# Patient Record
Sex: MALE | Race: ASIAN
[De-identification: names, ages, dates, MRNs, and addresses within clinical notes are randomized per-mention and may not be internally consistent; named-entity substitution may affect disease eponyms.]

---

## 2017-02-08 NOTE — ER DOCUMENT REPORT
ED Cardiac





- General


Chief Complaint: Chest Pain


Stated Complaint: CHEST PAIN


Time seen by provider: 04:55


Mode of Arrival: Ambulatory


Information source: Patient


TRAVEL OUTSIDE OF THE [place] IN LAST 30 DAYS: No





- HPI


Patient complains to provider of: Chest pain


Was the onset of pain: Sudden


Is the pain a: New problem


Chest pain location: Substernal


Quality of pain: Achy


Severity now: Mild


Severity at worst: Moderate


Pain level currently: 2


Chest pain precipitating factors: At Rest


Positive cardiac history: No


Exacerbated by: Coughing, Deep breaths, Lying flat


Relieved by: Nothing


Similar symptoms previously: No


Recently seen / treated by doctor: Yes


Notes: 


Patient is a 46-year-old male presenting to the emergency room complaining of 

chest pain that started yesterday afternoon, he had laparoscopic surgery on his 

left shoulder, shortly after the surgery his symptoms started, he denies any 

cough, cold or congestion, no shortness of breath, he was intubated for the 

procedure and it lasted approximately 2 hours, he denies a history of similar 

symptoms previously, no risk factors for heart disease, denies any leg pain or 

calf tenderness, states the pain is worse when he is lying flat, he is also had 

hiccups throughout the day





Past Medical History





- General


Information source: Patient





- Social History


Smoking Status: Current Every Day Smoker


Family History: Reviewed & Not Pertinent


Patient has suicidal ideation: No


Patient has homicidal ideation: No


Renal/ Medical History: Denies: Hx Peritoneal Dialysis


Past Surgical History: Reports: Hx Orthopedic Surgery - Lt Shoulder x2





- Immunizations


Hx Diphtheria, Pertussis, Tetanus Vaccination: Yes





Review of Systems





- Review of Systems


Constitutional: No symptoms reported


EENT: No symptoms reported


Cardiovascular: See HPI


Respiratory: See HPI


Gastrointestinal: No symptoms reported


Genitourinary: No symptoms reported


Male Genitourinary: No symptoms reported


Musculoskeletal: No symptoms reported


Skin: No symptoms reported


Hematologic/Lymphatic: No symptoms reported


Neurological/Psychological: No symptoms reported


-: Yes All other systems reviewed and negative





Physical Exam





- Vital signs


Vitals: 


 











Temp Pulse Resp BP Pulse Ox


 


 98.1 F   76   18   133/67 H  96 


 


 02/07/17 23:55  02/07/17 23:55  02/07/17 23:55  02/07/17 23:55  02/07/17 23:55











Interpretation: Normal





- General


General appearance: Appears well, Alert





- HEENT


Head: Normocephalic, Atraumatic


Eyes: Normal


Pupils: PERRL





- Respiratory


Respiratory status: No respiratory distress


Chest status: Nontender


Breath sounds: Normal


Chest palpation: Normal





- Cardiovascular


Rhythm: Regular


Heart sounds: Normal auscultation


Murmur: No





- Abdominal


Inspection: Normal


Distension: No distension


Bowel sounds: Normal


Tenderness: Nontender


Organomegaly: No organomegaly





- Back


Back: Normal, Nontender





- Extremities


General upper extremity: Normal color, Normal temperature


General lower extremity: Normal inspection, Nontender, Normal color, Normal ROM

, Normal temperature, Normal weight bearing.  No: Rajesh's sign


Shoulder: Other - Left shoulder and orthopedic immobilizer





- Neurological


Neuro grossly intact: Yes


Cognition: Normal


Orientation: AAOx4


Janie Coma Scale Eye Opening: Spontaneous


[place] Coma Scale Verbal: Oriented


Janie Coma Scale Motor: Obeys Commands


[place] Coma Scale Total: 15


Speech: Normal


Motor strength normal: LUE, RUE, LLE, RLE


Sensory: Normal





- Psychological


Associated symptoms: Normal affect, Normal mood





- Skin


Skin Temperature: Warm


Skin Moisture: Dry


Skin Color: Normal





Course





- Re-evaluation


Re-evalutation: 


02/08/17 04:55


Lab and imaging findings discussed with patient and spouse at bedside which are 

consistent for likely infectious process in the left lower lobe, patient will 

be started on antibiotics, advised to follow-up with his primary care provider 

and surgeon or return if symptoms worsen or fail to improve over the next 2-3 

days, I did discuss the possibility of developing a PE after surgery, however 

patient's surgery only lasted 2 hours and it was performed within the last 24 

hours so it is highly unlikely that he has developed a pulmonary emboli in the 

short period of time, he has no calf tenderness as well, but patient was 

advised on signs and symptoms and advised to return if they worsen, patient 

acknowledges understanding and agreement with this plan














- Vital Signs


Vital signs: 


 











Temp Pulse Resp BP Pulse Ox


 


 98.1 F   67   16   117/72   97 


 


 02/08/17 05:15  02/08/17 05:15  02/08/17 05:15  02/08/17 05:15  02/08/17 05:15














- Laboratory


Result Diagrams: 


 02/08/17 00:18





 02/08/17 00:18


Laboratory results interpreted by me: 


 











  02/08/17 02/08/17





  00:18 00:18


 


WBC  12.6 H 


 


Seg Neutrophils %  81.9 H 


 


Lymphocytes %  11.2 L 


 


Absolute Neutrophils  10.3 H 


 


ALT   102 H


 


Creatine Kinase   773 H














- Diagnostic Test


Radiology reviewed: Image reviewed, Reports reviewed





- EKG Interpretation by Me


EKG shows normal: Sinus rhythm


Rate: Normal


Rhythm: NSR





Discharge





- Discharge


Clinical Impression: 


Chest pain


Qualifiers:


 Chest pain type: unspecified Qualified Code(s): R07.9 - Chest pain, unspecified





Pneumonia


Qualifiers:


 Pneumonia type: due to unspecified organism Laterality: left Lung location: 

lower lobe of lung Qualified Code(s): J18.1 - Lobar pneumonia, unspecified 

organism





Condition: Stable


Disposition: HOME, SELF-CARE


Instructions:  Chest Pain of Unclear Cause ([facility]), Pneumonia ([facility])


Additional Instructions: 


Follow up with your primary care provider in one to 2 days.  Return to the 

emergency room immediately if symptoms worsen or any additional concerns.


Prescriptions: 


Levofloxacin [Levaquin 750 mg Tablet] 750 mg PO DAILY #5 tablet


Referrals: 


CASPER JOSHUA DO [Primary Care Provider] - Follow up as needed

## 2017-02-08 NOTE — EKG REPORT
SEVERITY:- BORDERLINE ECG -

SINUS RHYTHM

BORDERLINE T ABNORMALITIES, ANTERIOR LEADS

:

Confirmed by: Mikey Tavarez MD 08-Feb-2017 08:05:32

## 2017-06-21 NOTE — RADIOLOGY REPORT (SQ)
EXAM DESCRIPTION:  KNEE LEFT 4 VIEWS



COMPLETED DATE/TIME:  6/21/2017 5:22 pm



REASON FOR STUDY:  PAIN IN LEFT KNEE M25.562  PAIN IN LEFT KNEE



COMPARISON:  None.



NUMBER OF VIEWS:  Four views.



TECHNIQUE:  AP, lateral, and both oblique radiographic images acquired of the left knee.



LIMITATIONS:  None.



FINDINGS:  MINERALIZATION: Normal.

BONES: No acute fracture or dislocation.  No worrisome bone lesions.

JOINT: No effusion.

SOFT TISSUES: No soft tissue swelling.  No radio-opaque foreign body.

OTHER: No other significant finding.



IMPRESSION:  NEGATIVE STUDY OF THE LEFT KNEE. NO RADIOGRAPHIC EVIDENCE OF ACUTE INJURY.



TECHNICAL DOCUMENTATION:  JOB ID:  [id number]

 2011 [employer]- All Rights Reserved

## 2018-04-02 ENCOUNTER — HOSPITAL ENCOUNTER (OUTPATIENT)
Dept: HOSPITAL 62 - OD | Age: 48
End: 2018-04-02
Attending: NURSE PRACTITIONER
Payer: COMMERCIAL

## 2018-04-02 DIAGNOSIS — M79.89: ICD-10-CM

## 2018-04-02 DIAGNOSIS — M25.532: Primary | ICD-10-CM

## 2018-04-02 LAB
ADD MANUAL DIFF: NO
ALBUMIN SERPL-MCNC: 3.9 G/DL (ref 3.5–5)
ALP SERPL-CCNC: 87 U/L (ref 38–126)
ALT SERPL-CCNC: 60 U/L (ref 21–72)
ANION GAP SERPL CALC-SCNC: 9 MMOL/L (ref 5–19)
AST SERPL-CCNC: 35 U/L (ref 17–59)
BASOPHILS # BLD AUTO: 0.1 10^3/UL (ref 0–0.2)
BASOPHILS NFR BLD AUTO: 0.9 % (ref 0–2)
BILIRUB DIRECT SERPL-MCNC: 0.1 MG/DL (ref 0–0.4)
BILIRUB SERPL-MCNC: 0.1 MG/DL (ref 0.2–1.3)
BUN SERPL-MCNC: 18 MG/DL (ref 7–20)
CALCIUM: 9.2 MG/DL (ref 8.4–10.2)
CHLORIDE SERPL-SCNC: 107 MMOL/L (ref 98–107)
CO2 SERPL-SCNC: 26 MMOL/L (ref 22–30)
CRP SERPL-MCNC: 33.9 MG/L (ref ?–10)
EOSINOPHIL # BLD AUTO: 0.3 10^3/UL (ref 0–0.6)
EOSINOPHIL NFR BLD AUTO: 4.5 % (ref 0–6)
ERYTHROCYTE [DISTWIDTH] IN BLOOD BY AUTOMATED COUNT: 13.3 % (ref 11.5–14)
ERYTHROCYTE [SEDIMENTATION RATE] IN BLOOD: 35 MM/HR (ref 0–15)
GLUCOSE SERPL-MCNC: 106 MG/DL (ref 75–110)
HCT VFR BLD CALC: 39.5 % (ref 37.9–51)
HGB BLD-MCNC: 13.4 G/DL (ref 13.5–17)
LYMPHOCYTES # BLD AUTO: 1.7 10^3/UL (ref 0.5–4.7)
LYMPHOCYTES NFR BLD AUTO: 24.8 % (ref 13–45)
MCH RBC QN AUTO: 29.5 PG (ref 27–33.4)
MCHC RBC AUTO-ENTMCNC: 34 G/DL (ref 32–36)
MCV RBC AUTO: 87 FL (ref 80–97)
MONOCYTES # BLD AUTO: 0.6 10^3/UL (ref 0.1–1.4)
MONOCYTES NFR BLD AUTO: 9.4 % (ref 3–13)
NEUTROPHILS # BLD AUTO: 4.2 10^3/UL (ref 1.7–8.2)
NEUTS SEG NFR BLD AUTO: 60.4 % (ref 42–78)
PLATELET # BLD: 276 10^3/UL (ref 150–450)
POTASSIUM SERPL-SCNC: 4.1 MMOL/L (ref 3.6–5)
PROT SERPL-MCNC: 7.2 G/DL (ref 6.3–8.2)
RBC # BLD AUTO: 4.56 10^6/UL (ref 4.35–5.55)
SODIUM SERPL-SCNC: 141.7 MMOL/L (ref 137–145)
TOTAL CELLS COUNTED % (AUTO): 100 %
URATE SERPL-MCNC: 5.4 MG/DL (ref 3.5–8.5)
WBC # BLD AUTO: 6.9 10^3/UL (ref 4–10.5)

## 2018-04-02 PROCEDURE — 85025 COMPLETE CBC W/AUTO DIFF WBC: CPT

## 2018-04-02 PROCEDURE — 85652 RBC SED RATE AUTOMATED: CPT

## 2018-04-02 PROCEDURE — 86431 RHEUMATOID FACTOR QUANT: CPT

## 2018-04-02 PROCEDURE — 36415 COLL VENOUS BLD VENIPUNCTURE: CPT

## 2018-04-02 PROCEDURE — 86430 RHEUMATOID FACTOR TEST QUAL: CPT

## 2018-04-02 PROCEDURE — 80053 COMPREHEN METABOLIC PANEL: CPT

## 2018-04-02 PROCEDURE — 86038 ANTINUCLEAR ANTIBODIES: CPT

## 2018-04-02 PROCEDURE — 84550 ASSAY OF BLOOD/URIC ACID: CPT

## 2018-04-02 PROCEDURE — 86140 C-REACTIVE PROTEIN: CPT

## 2018-07-06 ENCOUNTER — HOSPITAL ENCOUNTER (OUTPATIENT)
Dept: HOSPITAL 62 - END | Age: 48
Discharge: HOME | End: 2018-07-06
Attending: INTERNAL MEDICINE
Payer: COMMERCIAL

## 2018-07-06 VITALS — SYSTOLIC BLOOD PRESSURE: 104 MMHG | DIASTOLIC BLOOD PRESSURE: 87 MMHG

## 2018-07-06 DIAGNOSIS — Z12.11: Primary | ICD-10-CM

## 2018-07-06 DIAGNOSIS — M05.89: ICD-10-CM

## 2018-07-06 DIAGNOSIS — F17.210: ICD-10-CM

## 2018-07-06 DIAGNOSIS — Z79.1: ICD-10-CM

## 2018-07-06 DIAGNOSIS — Z79.899: ICD-10-CM

## 2018-07-06 PROCEDURE — 45378 DIAGNOSTIC COLONOSCOPY: CPT

## 2018-07-06 RX ADMIN — MIDAZOLAM HYDROCHLORIDE ONE MG: 1 INJECTION, SOLUTION INTRAMUSCULAR; INTRAVENOUS at 09:08

## 2018-07-06 RX ADMIN — MIDAZOLAM HYDROCHLORIDE ONE MG: 1 INJECTION, SOLUTION INTRAMUSCULAR; INTRAVENOUS at 09:02

## 2018-07-06 NOTE — OPERATIVE REPORT
Operative Report


DATE OF SURGERY: 07/06/18


Operative Report: 





The risks, benefits and alternatives of the procedure are explained to the 

patient in detail and informed consent is obtained.


patient is brought back to the endoscopy suite, time out is called, conscious 

sedation is provided


A rectal exam is performed that does not reveal any masses , tears or fissures


An Olympus videoscope is inserted into the patient's rectum


The scope is carefully advanced to the cecum, this is identified by the usual 

anatomical landmarks of the ileocecal valve and the appendiceal orifice.


Prep is good, Photodocumentation is obtained


The scope was then sequentially pulled back via the various segments of the 

colon including the ascending colon, hepatic flexure, transverse colon, splenic 

flexure, descending colon and finally into the rectosigmoid portions of the 

colon.


Retroflexion maneuvers performed.


PREOPERATIVE DIAGNOSIS: Colorectal cancer screening


POSTOPERATIVE DIAGNOSIS: Normal screening colonoscopy


OPERATION: Diagnostic colonoscopy


SURGEON: NATE CHAN


ANESTHESIA: Moderate Sedation - 4 mg of Versed, 50 mcg of fentanyl.  Conscious 

sedation monitoring time 30 minutes.


TISSUE REMOVED OR ALTERED: None.


COMPLICATIONS: 





None.


ESTIMATED BLOOD LOSS: None.


INTRAOPERATIVE FINDINGS: As noted above.


PROCEDURE: 





Patient tolerated the procedure well.


No immediate postprocedure complications are noted.


Patient discharged in good condition.


Discharge date 7/6/2018.


Discharge diet: Regular.


Discharge activity: Regular.


2-3 week follow-up to discuss findings.


Patient is instructed to call the office or proceed to the emergency room 

should there be any further problems or questions.


10 year surveillance colonoscopy.